# Patient Record
Sex: FEMALE | Race: WHITE | NOT HISPANIC OR LATINO | URBAN - METROPOLITAN AREA
[De-identification: names, ages, dates, MRNs, and addresses within clinical notes are randomized per-mention and may not be internally consistent; named-entity substitution may affect disease eponyms.]

---

## 2017-01-05 ENCOUNTER — TRANSFERRED RECORDS (OUTPATIENT)
Dept: HEALTH INFORMATION MANAGEMENT | Facility: CLINIC | Age: 57
End: 2017-01-05

## 2017-03-01 ENCOUNTER — TELEPHONE (OUTPATIENT)
Dept: TRANSPLANT | Facility: CLINIC | Age: 57
End: 2017-03-01

## 2017-03-01 NOTE — TELEPHONE ENCOUNTER
Spoke with Mahsa, Manager of patient's dialysis center, about patient's attendance. Mahsa reported that for the last month patient has been coming 1 x weekly, and before that she was coming 2 x weekly. When asked about compliance with medications, Mahsa reports that patient's phosphorus is high, so she does not believe patient is compliant with her meds.

## 2017-03-01 NOTE — TELEPHONE ENCOUNTER
Spoke with pharmacist, Eagle, at [facility] who reports patient never filled the Rx for 500 mg Cellcept that was sent to her from transplant team in August 2016.

## 2017-03-08 ENCOUNTER — TEAM CONFERENCE (OUTPATIENT)
Dept: TRANSPLANT | Facility: CLINIC | Age: 57
End: 2017-03-08

## 2017-03-08 NOTE — TELEPHONE ENCOUNTER
TEAM CONF:    ATTENDEES: Tremayne Lund Dunn, Coordinators, [place], [facility], Pharmacy.     DISCUSSION and OUTCOME:  Has not complied with the terms of the compliance contract, no longer candidate here.     Letter sent with this information.

## 2017-03-08 NOTE — LETTER
March 8, 2017    Joan Nieto  [address]  [facility] [address]      Dear Ms. Nieto,    The purpose of this letter is to let you know the [facility] Multi-Disciplinary Selection Team made the decision to remove you from the kidney transplant list.  This is because you have not met the terms from the previously sent compliance contract.   Enclosed is a letter from [employer] which describes the services offered to patients by [facility] and the [facility].  Thank you for allowing us to participate in your care.  We wish you well.    Sincerely,    Kidney Transplant Team  Enclosure:  [employer] Letter     CC:   Nephrology and Dialysis